# Patient Record
(demographics unavailable — no encounter records)

---

## 2025-03-26 NOTE — DEVELOPMENTAL MILESTONES
[Normal Development] : Normal Development [None] : none [Goes to the bathroom and has] : goes to bathroom and has bowel movement by self [Plays make-believe] : plays make-believe [Uses 4-word sentences] : uses 4-word sentences [Uses words that are 100%] : uses words that are 100% intelligible to strangers [Grasps a pencil with thumb and] : grasps a pencil with thumb and fingers instead of fist [Draws recognizable pictures] : draws recognizable pictures [Dresses and undresses without] : dresses and undresses without much help [FreeTextEntry1] : draws a square

## 2025-03-26 NOTE — DISCUSSION/SUMMARY
[Normal Development] : development  [Normal Sleep Pattern] : sleep [BMI ___] : body mass index of [unfilled] [Picky Eater] : picky eater [DTaP] : diptheria, tetanus and pertussis [IPV] : inactivated poliovirus [MMR] : measles, mumps and rubella [No Medications] : ~He/She~ is not on any medications [Mother] : mother [Father] : father [] : The components of the vaccine(s) to be administered today are listed in the plan of care. The disease(s) for which the vaccine(s) are intended to prevent and the risks have been discussed with the caretaker.  The risks are also included in the appropriate vaccination information statements which have been provided to the patient's caregiver.  The caregiver has given consent to vaccinate. [FreeTextEntry1] :  Goldie 4 year old girl with no chronic medical problems  Underweight, but adequate weight gain of 4.5 lb in the last year Developmentally appropriate Persistent cough for 2 weeks following URI (likely post-viral cough vs. upper airway cough secondary to post-nasal drip); no concern for AOM or pneumonia  Evidence of postnasal drip on exam  1) Health maintenance  - Dietary counseling provided, incorporate healthy fats and calorie-dense foods - Increase dietary fiber and water intake to prevent constipation, consider daily OTC fiber supplement - Routine F/U with dentist - Received Quadracel (DTaP #5/IPV #4), MMR #2, COVID-19 (Moderna) vaccines - Routine CBC and lead testing ordered - Recommend Flu and COVID-19 vaccines in the fall  2) Persistent cough - Continue supportive care - Trial OTC Flonase Sensimist as needed for nasal congestion/postnasal drip

## 2025-03-26 NOTE — HISTORY OF PRESENT ILLNESS
[Parents] : parents [whole ___ oz/d] : consumes [unfilled] oz of whole cow's milk per day [Fruit] : fruit [Meat] : meat [Grains] : grains [Dairy] : dairy [___ stools per day] : [unfilled]  stools per day [Toilet Trained] : toilet trained [Normal] : Normal [Brushing teeth] : Brushing teeth [Yes] : Patient goes to dentist yearly [Toothpaste] : Primary Fluoride Source: Toothpaste [Appropiate parent-child communication] : Appropriate parent-child communication [Parent has appropriate responses to behavior] : Parent has appropriate responses to behavior [No] : Not at  exposure [Car seat in back seat] : Car seat in back seat [Up to date] : Up to date [Exposure to electronic nicotine delivery system] : No exposure to electronic nicotine delivery system [FreeTextEntry7] : no interval ER/UC visits   [de-identified] : lingering dry cough following URI 2 weeks ago, no noisy breathing or SOB (no hx of wheezing) [de-identified] : parents encourage protein and vegetables  [FreeTextEntry8] : intermittent constipation managed with increased water intake [FreeTextEntry9] : does well in ; plays well with her sister [de-identified] : lives with parents and 2 1/2 year old sister [de-identified] : received COVID-19 (Moderna) vaccines on 10/21/22 & 11/18/22 received Flu vaccine at pharmacy [FreeTextEntry1] :  Of note, mother is a Cardiologist/Cardiac Intensivist at [facility] [employer]'s [facility]

## 2025-03-26 NOTE — REVIEW OF SYSTEMS
[Nasal Congestion] : nasal congestion [Cough] : cough [Negative] : Genitourinary [Ear Pain] : no ear pain [Tachypnea] : not tachypneic [Wheezing] : no wheezing

## 2025-03-26 NOTE — PHYSICAL EXAM
[Alert] : alert [No Acute Distress] : no acute distress [Playful] : playful [Normocephalic] : normocephalic [Conjunctivae with no discharge] : conjunctivae with no discharge [PERRL] : PERRL [EOMI Bilateral] : EOMI bilateral [Auricles Well Formed] : auricles well formed [Clear Tympanic membranes with present light reflex and bony landmarks] : clear tympanic membranes with present light reflex and bony landmarks [No Discharge] : no discharge [Pink Nasal Mucosa] : pink nasal mucosa [Uvula Midline] : uvula midline [Nonerythematous Oropharynx] : nonerythematous oropharynx [No Caries] : no caries [Supple, full passive range of motion] : supple, full passive range of motion [No Palpable Masses] : no palpable masses [Clear to Auscultation Bilaterally] : clear to auscultation bilaterally [Normoactive Precordium] : normoactive precordium [Regular Rate and Rhythm] : regular rate and rhythm [Normal S1, S2 present] : normal S1, S2 present [No Murmurs] : no murmurs [Soft] : soft [NonTender] : non tender [Non Distended] : non distended [Normoactive Bowel Sounds] : normoactive bowel sounds [Trevor 1] : Trevor 1 [Symmetric Hip Rotation] : symmetric hip rotation [No pain or deformities with palpation of bone, muscles, joints] : no pain or deformities with palpation of bone, muscles, joints [Normal Muscle Tone] : normal muscle tone [Straight] : straight [No Rash or Lesions] : no rash or lesions [FreeTextEntry1] : pleasant, cooperative with exam; frequent dry cough provoked by deep breathing  [de-identified] : mild posterior pharyngeal cobblestoning  [FreeTextEntry7] : normal resp rate and effort; no wheezing, crackles, rhonchi [FreeTextEntry8] : prior Still's murmur not auscultated today [de-identified] : grossly normal tone and strength